# Patient Record
Sex: MALE | HISPANIC OR LATINO | URBAN - METROPOLITAN AREA
[De-identification: names, ages, dates, MRNs, and addresses within clinical notes are randomized per-mention and may not be internally consistent; named-entity substitution may affect disease eponyms.]

---

## 2018-09-14 ENCOUNTER — OFFICE VISIT (OUTPATIENT)
Dept: URBAN - METROPOLITAN AREA CLINIC 88 | Facility: CLINIC | Age: 73
End: 2018-09-14
Payer: COMMERCIAL

## 2018-09-14 DIAGNOSIS — H43.393 OTHER VITREOUS OPACITIES, BILATERAL: ICD-10-CM

## 2018-09-14 PROCEDURE — 99214 OFFICE O/P EST MOD 30 MIN: CPT | Performed by: OPHTHALMOLOGY

## 2018-09-14 ASSESSMENT — INTRAOCULAR PRESSURE
OD: 14
OS: 14

## 2018-09-14 NOTE — IMPRESSION/PLAN
Impression: Other vitreous opacities, bilateral: H43.393. Plan: Discussed signs and symptoms of PVD/floaters. Patient instructed to call the office immediately if any symptoms noted.

## 2018-09-14 NOTE — IMPRESSION/PLAN
Impression: Type 2 diab with mild nonp rtnop without macular edema, bi: L41.0506. Plan: Discussed diagnosis in detail with patient. Discussed ocular and systemic benefits of blood sugar control. Keep future appts. with PCP. No treatment necessary at this time. Patient was instructed to monitor vision for sudden changes and to call if visual changes noted.

## 2019-10-22 ENCOUNTER — OFFICE VISIT (OUTPATIENT)
Dept: URBAN - METROPOLITAN AREA CLINIC 88 | Facility: CLINIC | Age: 74
End: 2019-10-22
Payer: COMMERCIAL

## 2019-10-22 DIAGNOSIS — E11.3293 TYPE 2 DIAB WITH MILD NONP RTNOP WITHOUT MACULAR EDEMA, BI: Primary | ICD-10-CM

## 2019-10-22 DIAGNOSIS — H02.839 DERMATOCHALASIS OF UNSPECIFIED EYE, UNSPECIFIED EYELID: ICD-10-CM

## 2019-10-22 DIAGNOSIS — H17.9 CORNEAL OPACITY: ICD-10-CM

## 2019-10-22 DIAGNOSIS — Z96.1 PRESENCE OF INTRAOCULAR LENS: ICD-10-CM

## 2019-10-22 PROCEDURE — 99214 OFFICE O/P EST MOD 30 MIN: CPT | Performed by: OPHTHALMOLOGY

## 2019-10-22 ASSESSMENT — INTRAOCULAR PRESSURE
OD: 14
OS: 14

## 2019-10-22 NOTE — IMPRESSION/PLAN
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 OS. Condition: established, stable. Plan: Longstanding condition. Continue to observe.  OK to use OTC ART tears 1 qtt TID OU

## 2019-10-22 NOTE — IMPRESSION/PLAN
Impression: Type 2 diab with mild nonp rtnop without macular edema, bi: D04.0423. Condition: established, stable. Symptoms: will continue to monitor. Plan: Discussed diagnosis in detail with patient. Discussed ocular and systemic benefits of blood sugar control. Keep future appts. with PCP. No treatment necessary at this time. Patient was instructed to monitor vision for sudden changes and to call if visual changes noted.

## 2019-10-22 NOTE — IMPRESSION/PLAN
Impression: Presence of intraocular lens: Z96.1. OU. Condition: established, stable.  Plan: Stable, continue to observe

## 2019-10-22 NOTE — IMPRESSION/PLAN
Impression: Dermatochalasis of unspecified eye, unspecified eyelid: H02.839. OU. Condition: established, stable. Plan: Discussed diagnosis in detail with patient. No treatment is required at this time.  Continue to observe

## 2021-01-20 ENCOUNTER — OFFICE VISIT (OUTPATIENT)
Dept: URBAN - METROPOLITAN AREA CLINIC 88 | Facility: CLINIC | Age: 76
End: 2021-01-20
Payer: COMMERCIAL

## 2021-01-20 DIAGNOSIS — H35.3190 NONEXUDATIVE AGE-RELATED MACULAR DEGENERATION: ICD-10-CM

## 2021-01-20 PROCEDURE — 99214 OFFICE O/P EST MOD 30 MIN: CPT | Performed by: OPHTHALMOLOGY

## 2021-01-20 ASSESSMENT — KERATOMETRY
OD: 43.25
OS: 43.13

## 2021-01-20 ASSESSMENT — INTRAOCULAR PRESSURE
OD: 14
OS: 14

## 2021-01-20 NOTE — IMPRESSION/PLAN
Impression: Type 2 diab with mild nonp rtnop without macular edema, bi: U88.6015. Condition: established, stable. Plan: Discussed diagnosis in detail with patient. Discussed ocular and systemic benefits of blood sugar control. Keep future appts. with PCP. No treatment necessary at this time. Patient was instructed to monitor vision for sudden changes and to call if visual changes noted.

## 2022-07-28 ENCOUNTER — OFFICE VISIT (OUTPATIENT)
Dept: URBAN - METROPOLITAN AREA CLINIC 88 | Facility: CLINIC | Age: 77
End: 2022-07-28
Payer: MEDICARE

## 2022-07-28 DIAGNOSIS — H17.89 OTHER CORNEAL SCARS AND OPACITIES: ICD-10-CM

## 2022-07-28 DIAGNOSIS — H43.813 VITREOUS DEGENERATION, BILATERAL: ICD-10-CM

## 2022-07-28 DIAGNOSIS — E11.3293 TYPE 2 DIAB WITH MILD NONP RTNOP WITHOUT MACULAR EDEMA, BI: Primary | ICD-10-CM

## 2022-07-28 DIAGNOSIS — Z96.1 PRESENCE OF INTRAOCULAR LENS: ICD-10-CM

## 2022-07-28 PROCEDURE — 99214 OFFICE O/P EST MOD 30 MIN: CPT | Performed by: OPHTHALMOLOGY

## 2022-07-28 ASSESSMENT — KERATOMETRY
OD: 43.00
OS: 43.00

## 2022-07-28 ASSESSMENT — INTRAOCULAR PRESSURE
OD: 14
OS: 15

## 2022-07-28 ASSESSMENT — VISUAL ACUITY
OS: 20/25
OD: 20/40

## 2022-07-28 NOTE — IMPRESSION/PLAN
Impression: Type 2 diab with mild nonp rtnop without macular edema, bi: V83.1045. Plan: Discussed diagnosis in detail with patient. Discussed ocular and systemic benefits of blood sugar control. Keep future appts. with PCP. No treatment necessary at this time. Patient was instructed to monitor vision for sudden changes and to call if visual changes noted.

## 2023-08-29 ENCOUNTER — OFFICE VISIT (OUTPATIENT)
Dept: URBAN - METROPOLITAN AREA CLINIC 88 | Facility: CLINIC | Age: 78
End: 2023-08-29
Payer: MEDICARE

## 2023-08-29 DIAGNOSIS — D31.32 BENIGN NEOPLASM OF LEFT CHOROID: ICD-10-CM

## 2023-08-29 DIAGNOSIS — Z96.1 PRESENCE OF INTRAOCULAR LENS: ICD-10-CM

## 2023-08-29 DIAGNOSIS — E11.3293 TYPE 2 DIAB WITH MILD NONP RTNOP WITHOUT MACULAR EDEMA, BI: Primary | ICD-10-CM

## 2023-08-29 DIAGNOSIS — H17.89 OTHER CORNEAL SCARS AND OPACITIES: ICD-10-CM

## 2023-08-29 PROCEDURE — 99214 OFFICE O/P EST MOD 30 MIN: CPT | Performed by: OPHTHALMOLOGY

## 2023-08-29 ASSESSMENT — INTRAOCULAR PRESSURE
OS: 15
OD: 15

## 2024-08-29 ENCOUNTER — OFFICE VISIT (OUTPATIENT)
Dept: URBAN - METROPOLITAN AREA CLINIC 88 | Facility: CLINIC | Age: 79
End: 2024-08-29
Payer: MEDICARE

## 2024-08-29 DIAGNOSIS — H43.393 OTHER VITREOUS OPACITIES, BILATERAL: ICD-10-CM

## 2024-08-29 DIAGNOSIS — Z96.1 PRESENCE OF INTRAOCULAR LENS: ICD-10-CM

## 2024-08-29 DIAGNOSIS — H17.89 OTHER CORNEAL SCARS AND OPACITIES: ICD-10-CM

## 2024-08-29 DIAGNOSIS — E11.3293 TYPE 2 DIAB WITH MILD NONP RTNOP WITHOUT MACULAR EDEMA, BI: Primary | ICD-10-CM

## 2024-08-29 PROCEDURE — 99213 OFFICE O/P EST LOW 20 MIN: CPT | Performed by: OPHTHALMOLOGY

## 2024-08-29 ASSESSMENT — INTRAOCULAR PRESSURE
OS: 15
OD: 13